# Patient Record
Sex: FEMALE | Race: WHITE | NOT HISPANIC OR LATINO | Employment: UNEMPLOYED | URBAN - METROPOLITAN AREA
[De-identification: names, ages, dates, MRNs, and addresses within clinical notes are randomized per-mention and may not be internally consistent; named-entity substitution may affect disease eponyms.]

---

## 2024-04-22 ENCOUNTER — TRANSCRIBE ORDERS (OUTPATIENT)
Dept: OBSTETRICS AND GYNECOLOGY | Facility: HOSPITAL | Age: 29
End: 2024-04-22
Payer: COMMERCIAL

## 2024-04-22 DIAGNOSIS — O36.8990 FETAL PERICARDIAL EFFUSION AFFECTING MANAGEMENT OF MOTHER: Primary | ICD-10-CM

## 2024-04-22 DIAGNOSIS — Z34.90 PREGNANCY, UNSPECIFIED GESTATIONAL AGE: ICD-10-CM

## 2024-04-22 DIAGNOSIS — Z98.891 HISTORY OF 2 CESAREAN SECTIONS: ICD-10-CM

## 2024-04-30 ENCOUNTER — OFFICE VISIT (OUTPATIENT)
Dept: OBSTETRICS AND GYNECOLOGY | Facility: HOSPITAL | Age: 29
End: 2024-04-30
Payer: COMMERCIAL

## 2024-04-30 ENCOUNTER — HOSPITAL ENCOUNTER (OUTPATIENT)
Dept: WOMENS IMAGING | Facility: HOSPITAL | Age: 29
Discharge: HOME | End: 2024-04-30
Payer: COMMERCIAL

## 2024-04-30 VITALS
SYSTOLIC BLOOD PRESSURE: 127 MMHG | BODY MASS INDEX: 33.22 KG/M2 | WEIGHT: 194.6 LBS | HEIGHT: 64 IN | DIASTOLIC BLOOD PRESSURE: 74 MMHG

## 2024-04-30 DIAGNOSIS — Z98.891 HISTORY OF 2 CESAREAN SECTIONS: ICD-10-CM

## 2024-04-30 DIAGNOSIS — O34.219 HISTORY OF CESAREAN SECTION COMPLICATING PREGNANCY: ICD-10-CM

## 2024-04-30 DIAGNOSIS — Z34.90 PREGNANCY, UNSPECIFIED GESTATIONAL AGE: ICD-10-CM

## 2024-04-30 DIAGNOSIS — Z03.73 FETAL ANOMALY SUSPECTED BUT NOT FOUND: Primary | ICD-10-CM

## 2024-04-30 DIAGNOSIS — O36.8990 FETAL PERICARDIAL EFFUSION AFFECTING MANAGEMENT OF MOTHER: ICD-10-CM

## 2024-04-30 PROCEDURE — 76811 OB US DETAILED SNGL FETUS: CPT | Performed by: OBSTETRICS & GYNECOLOGY

## 2024-04-30 PROCEDURE — 99203 OFFICE O/P NEW LOW 30 MIN: CPT | Performed by: OBSTETRICS & GYNECOLOGY

## 2024-04-30 PROCEDURE — 76811 OB US DETAILED SNGL FETUS: CPT

## 2024-04-30 RX ORDER — SERTRALINE HYDROCHLORIDE 100 MG/1
150 TABLET, FILM COATED ORAL DAILY
COMMUNITY
Start: 2024-03-12 | End: 2025-04-16

## 2024-04-30 RX ORDER — PNV NO.95/FERROUS FUM/FOLIC AC 28MG-0.8MG
1 TABLET ORAL DAILY
COMMUNITY
Start: 2023-12-04 | End: 2024-12-03

## 2024-04-30 RX ORDER — ONDANSETRON 8 MG/1
8 TABLET, ORALLY DISINTEGRATING ORAL AS NEEDED
COMMUNITY
Start: 2024-02-22

## 2024-04-30 NOTE — PROGRESS NOTES
Patient sent by Miki for Fetal pericardial effusion, Hx of c/s x 2   Next f/u with Miki tomorrow  NIPT neg

## 2024-04-30 NOTE — PROGRESS NOTES
"    Maternal/Fetal Medicine Consult Note   Date: 2024  Name: Ellie Hackett    : 1995     MRN: [id number]     Referring Provider: ANH Ag    Chief Complaint  fetal pericardial effusion, Hx of c/s x 2     Subjective     History of Present Illness:  Ellie Hackett is a 28 y.o.  28w2d who presents today for fetal pericardial effusion    VIVIENNE: Estimated Date of Delivery: 24     ROS:   Otherwise Noted in HPI    Past Medical History:   Diagnosis Date    Anxiety and depression       Past Surgical History:   Procedure Laterality Date     SECTION      x2      OB History          3    Para   2    Term   2       0    AB   0    Living   2         SAB   0    IAB   0    Ectopic   0    Molar   0    Multiple   0    Live Births   2          Obstetric Comments   Fob #1 - Pregnancy #1- #3                Current Outpatient Medications:     ondansetron ODT (ZOFRAN-ODT) 8 MG disintegrating tablet, Place 1 tablet on the tongue As Needed., Disp: , Rfl:     Prenatal Vit-Fe Fumarate-FA (prenatal vitamin 28-0.8) 28-0.8 MG tablet tablet, Take 1 tablet by mouth Daily., Disp: , Rfl:     sertraline (ZOLOFT) 100 MG tablet, Take 1.5 tablets by mouth Daily., Disp: , Rfl:     Objective     Vital Signs  /74   Ht 162.6 cm (64\")   Wt 88.3 kg (194 lb 9.6 oz)   LMP  (LMP Unknown)   Estimated body mass index is 33.4 kg/m² as calculated from the following:    Height as of this encounter: 162.6 cm (64\").    Weight as of this encounter: 88.3 kg (194 lb 9.6 oz).    Ultrasound Impression:   See Viewpoint     Assessment and Plan     Ellie Hackett is a 28 y.o.  28w2d who presents today for fetal pericardial effusion    Diagnoses and all orders for this visit:    1. Fetal anomaly suspected but not found (Primary)  Assessment & Plan:  Patient sent for concern for pericardial effusion.  Today's anatomy appears normal there is no evidence of pericardial effusion.  Cardiac anatomy appears " normal.  Follow-up as clinically indicated.      2. History of  section complicating pregnancy  Assessment & Plan:  Patient with a history of 2 prior  sections.  Placenta is posterior not previous  scars.           Follow Up  Follow-up as clinically indicated    I spent 15 minutes caring for the patient on the day of service. This included: obtaining or reviewing a separately obtained medical history, reviewing patient records, performing a medically appropriate exam and/or evaluation, counseling or educating the patient/family/caregiver, ordering medications, labs, and/or procedures and documenting such in the medical record. This does not include time spent on review and interpretation of other tests such as fetal ultrasound or the performance of other procedures such as amniocentesis or CVS.      Darvin Montiel MD, FACOG  Maternal Fetal Medicine, [facility] [place]

## 2024-04-30 NOTE — ASSESSMENT & PLAN NOTE
Patient sent for concern for pericardial effusion.  Today's anatomy appears normal there is no evidence of pericardial effusion.  Cardiac anatomy appears normal.  Follow-up as clinically indicated.

## 2024-04-30 NOTE — LETTER
"2024       No Recipients    Patient: Ellie Hackett   YOB: 1995   Date of Visit: 2024       Dear ANH Ag,    Thank you for referring Ellie Hackett to me for evaluation. Below is a copy of my consult note.    If you have questions, please do not hesitate to call me. I look forward to following Ellie along with you.         Sincerely,        Darvin Montiel MD        CC:   No Recipients    Patient sent by Miki for Fetal pericardial effusion, Hx of c/s x 2   Next f/u with Miki tomorrow  NIPT neg         Maternal/Fetal Medicine Consult Note   Date: 2024  Name: lElie Hackett    : 1995     MRN: [id number]     Referring Provider: ANH Ag    Chief Complaint  fetal pericardial effusion, Hx of c/s x 2     Subjective     History of Present Illness:  Ellie Hackett is a 28 y.o.  28w2d who presents today for fetal pericardial effusion    VIVIENNE: Estimated Date of Delivery: 24     ROS:   Otherwise Noted in HPI    Past Medical History:   Diagnosis Date   • Anxiety and depression       Past Surgical History:   Procedure Laterality Date   •  SECTION      x2      OB History          3    Para   2    Term   2       0    AB   0    Living   2         SAB   0    IAB   0    Ectopic   0    Molar   0    Multiple   0    Live Births   2          Obstetric Comments   Fob #1 - Pregnancy #1- #3                Current Outpatient Medications:   •  ondansetron ODT (ZOFRAN-ODT) 8 MG disintegrating tablet, Place 1 tablet on the tongue As Needed., Disp: , Rfl:   •  Prenatal Vit-Fe Fumarate-FA (prenatal vitamin 28-0.8) 28-0.8 MG tablet tablet, Take 1 tablet by mouth Daily., Disp: , Rfl:   •  sertraline (ZOLOFT) 100 MG tablet, Take 1.5 tablets by mouth Daily., Disp: , Rfl:     Objective     Vital Signs  /74   Ht 162.6 cm (64\")   Wt 88.3 kg (194 lb 9.6 oz)   LMP  (LMP Unknown)   Estimated body mass index is 33.4 kg/m² as calculated from " "the following:    Height as of this encounter: 162.6 cm (64\").    Weight as of this encounter: 88.3 kg (194 lb 9.6 oz).    Ultrasound Impression:   See Viewpoint     Assessment and Plan     Ellie Hackett is a 28 y.o.  28w2d who presents today for fetal pericardial effusion    Diagnoses and all orders for this visit:    1. Fetal anomaly suspected but not found (Primary)  Assessment & Plan:  Patient sent for concern for pericardial effusion.  Today's anatomy appears normal there is no evidence of pericardial effusion.  Cardiac anatomy appears normal.  Follow-up as clinically indicated.      2. History of  section complicating pregnancy  Assessment & Plan:  Patient with a history of 2 prior  sections.  Placenta is posterior not previous  scars.           Follow Up  Follow-up as clinically indicated    I spent 15 minutes caring for the patient on the day of service. This included: obtaining or reviewing a separately obtained medical history, reviewing patient records, performing a medically appropriate exam and/or evaluation, counseling or educating the patient/family/caregiver, ordering medications, labs, and/or procedures and documenting such in the medical record. This does not include time spent on review and interpretation of other tests such as fetal ultrasound or the performance of other procedures such as amniocentesis or CVS.      Darvin Montiel MD, FACOG  Maternal Fetal Medicine, [facility]   "